# Patient Record
Sex: MALE | Race: WHITE | NOT HISPANIC OR LATINO | URBAN - METROPOLITAN AREA
[De-identification: names, ages, dates, MRNs, and addresses within clinical notes are randomized per-mention and may not be internally consistent; named-entity substitution may affect disease eponyms.]

---

## 2017-07-07 ENCOUNTER — OUTPATIENT (OUTPATIENT)
Dept: OUTPATIENT SERVICES | Facility: HOSPITAL | Age: 47
LOS: 1 days | Discharge: HOME | End: 2017-07-07

## 2017-07-07 DIAGNOSIS — F11.20 OPIOID DEPENDENCE, UNCOMPLICATED: ICD-10-CM

## 2017-07-12 ENCOUNTER — OUTPATIENT (OUTPATIENT)
Dept: OUTPATIENT SERVICES | Facility: HOSPITAL | Age: 47
LOS: 1 days | Discharge: HOME | End: 2017-07-12

## 2017-07-12 DIAGNOSIS — I49.9 CARDIAC ARRHYTHMIA, UNSPECIFIED: ICD-10-CM

## 2018-06-19 ENCOUNTER — OUTPATIENT (OUTPATIENT)
Dept: OUTPATIENT SERVICES | Facility: HOSPITAL | Age: 48
LOS: 1 days | Discharge: HOME | End: 2018-06-19

## 2018-06-19 DIAGNOSIS — F11.20 OPIOID DEPENDENCE, UNCOMPLICATED: ICD-10-CM

## 2018-07-13 ENCOUNTER — OUTPATIENT (OUTPATIENT)
Dept: OUTPATIENT SERVICES | Facility: HOSPITAL | Age: 48
LOS: 1 days | Discharge: HOME | End: 2018-07-13

## 2018-07-13 DIAGNOSIS — I49.9 CARDIAC ARRHYTHMIA, UNSPECIFIED: ICD-10-CM

## 2019-05-30 ENCOUNTER — EMERGENCY (EMERGENCY)
Facility: HOSPITAL | Age: 49
LOS: 0 days | Discharge: HOME | End: 2019-05-30
Attending: EMERGENCY MEDICINE
Payer: MEDICARE

## 2019-05-30 VITALS
HEART RATE: 101 BPM | SYSTOLIC BLOOD PRESSURE: 124 MMHG | OXYGEN SATURATION: 95 % | DIASTOLIC BLOOD PRESSURE: 84 MMHG | RESPIRATION RATE: 18 BRPM

## 2019-05-30 VITALS — HEART RATE: 91 BPM

## 2019-05-30 DIAGNOSIS — R69 ILLNESS, UNSPECIFIED: ICD-10-CM

## 2019-05-30 DIAGNOSIS — F41.9 ANXIETY DISORDER, UNSPECIFIED: ICD-10-CM

## 2019-05-30 DIAGNOSIS — Z02.9 ENCOUNTER FOR ADMINISTRATIVE EXAMINATIONS, UNSPECIFIED: ICD-10-CM

## 2019-05-30 DIAGNOSIS — F32.9 MAJOR DEPRESSIVE DISORDER, SINGLE EPISODE, UNSPECIFIED: ICD-10-CM

## 2019-05-30 DIAGNOSIS — F13.20 SEDATIVE, HYPNOTIC OR ANXIOLYTIC DEPENDENCE, UNCOMPLICATED: ICD-10-CM

## 2019-05-30 DIAGNOSIS — F43.10 POST-TRAUMATIC STRESS DISORDER, UNSPECIFIED: ICD-10-CM

## 2019-05-30 LAB
ANION GAP SERPL CALC-SCNC: 13 MMOL/L — SIGNIFICANT CHANGE UP (ref 7–14)
APAP SERPL-MCNC: <5 UG/ML — LOW (ref 10–30)
BASOPHILS # BLD AUTO: 0.07 K/UL — SIGNIFICANT CHANGE UP (ref 0–0.2)
BASOPHILS NFR BLD AUTO: 0.5 % — SIGNIFICANT CHANGE UP (ref 0–1)
BUN SERPL-MCNC: 19 MG/DL — SIGNIFICANT CHANGE UP (ref 10–20)
CALCIUM SERPL-MCNC: 9.3 MG/DL — SIGNIFICANT CHANGE UP (ref 8.5–10.1)
CHLORIDE SERPL-SCNC: 104 MMOL/L — SIGNIFICANT CHANGE UP (ref 98–110)
CO2 SERPL-SCNC: 22 MMOL/L — SIGNIFICANT CHANGE UP (ref 17–32)
CREAT SERPL-MCNC: 1 MG/DL — SIGNIFICANT CHANGE UP (ref 0.7–1.5)
EOSINOPHIL # BLD AUTO: 0.16 K/UL — SIGNIFICANT CHANGE UP (ref 0–0.7)
EOSINOPHIL NFR BLD AUTO: 1.1 % — SIGNIFICANT CHANGE UP (ref 0–8)
ETHANOL SERPL-MCNC: <10 MG/DL — SIGNIFICANT CHANGE UP
GLUCOSE SERPL-MCNC: 84 MG/DL — SIGNIFICANT CHANGE UP (ref 70–99)
HCT VFR BLD CALC: 42.3 % — SIGNIFICANT CHANGE UP (ref 42–52)
HGB BLD-MCNC: 13.4 G/DL — LOW (ref 14–18)
IMM GRANULOCYTES NFR BLD AUTO: 1 % — HIGH (ref 0.1–0.3)
LIDOCAIN IGE QN: 25 U/L — SIGNIFICANT CHANGE UP (ref 7–60)
LYMPHOCYTES # BLD AUTO: 36.9 % — SIGNIFICANT CHANGE UP (ref 20.5–51.1)
LYMPHOCYTES # BLD AUTO: 5.27 K/UL — HIGH (ref 1.2–3.4)
MCHC RBC-ENTMCNC: 21.3 PG — LOW (ref 27–31)
MCHC RBC-ENTMCNC: 31.7 G/DL — LOW (ref 32–37)
MCV RBC AUTO: 67.1 FL — LOW (ref 80–94)
MONOCYTES # BLD AUTO: 1.1 K/UL — HIGH (ref 0.1–0.6)
MONOCYTES NFR BLD AUTO: 7.7 % — SIGNIFICANT CHANGE UP (ref 1.7–9.3)
NEUTROPHILS # BLD AUTO: 7.56 K/UL — HIGH (ref 1.4–6.5)
NEUTROPHILS NFR BLD AUTO: 52.8 % — SIGNIFICANT CHANGE UP (ref 42.2–75.2)
NRBC # BLD: 0 /100 WBCS — SIGNIFICANT CHANGE UP (ref 0–0)
PLATELET # BLD AUTO: 220 K/UL — SIGNIFICANT CHANGE UP (ref 130–400)
POTASSIUM SERPL-MCNC: 4.3 MMOL/L — SIGNIFICANT CHANGE UP (ref 3.5–5)
POTASSIUM SERPL-SCNC: 4.3 MMOL/L — SIGNIFICANT CHANGE UP (ref 3.5–5)
RBC # BLD: 6.3 M/UL — HIGH (ref 4.7–6.1)
RBC # FLD: 19.5 % — HIGH (ref 11.5–14.5)
SALICYLATES SERPL-MCNC: <0.3 MG/DL — LOW (ref 4–30)
SODIUM SERPL-SCNC: 139 MMOL/L — SIGNIFICANT CHANGE UP (ref 135–146)
WBC # BLD: 14.3 K/UL — HIGH (ref 4.8–10.8)
WBC # FLD AUTO: 14.3 K/UL — HIGH (ref 4.8–10.8)

## 2019-05-30 PROCEDURE — 93010 ELECTROCARDIOGRAM REPORT: CPT

## 2019-05-30 PROCEDURE — 90792 PSYCH DIAG EVAL W/MED SRVCS: CPT | Mod: GC

## 2019-05-30 PROCEDURE — 99284 EMERGENCY DEPT VISIT MOD MDM: CPT

## 2019-05-30 RX ORDER — ALPRAZOLAM 0.25 MG
2 TABLET ORAL ONCE
Refills: 0 | Status: DISCONTINUED | OUTPATIENT
Start: 2019-05-30 | End: 2019-05-30

## 2019-05-30 RX ADMIN — Medication 2 MILLIGRAM(S): at 07:55

## 2019-05-30 RX ADMIN — Medication 25 MILLIGRAM(S): at 10:12

## 2019-05-30 NOTE — ED BEHAVIORAL HEALTH ASSESSMENT NOTE - DETAILS
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

## 2019-05-30 NOTE — ED BEHAVIORAL HEALTH ASSESSMENT NOTE - DESCRIPTION (FIRST USE, LAST USE, QUANTITY, FREQUENCY, DURATION)
1/2 PPD for "many years" social drinker until 1 week ago - daily use of 2 glasses of scotch per day socially decades ago, with "one key bump" 1 week ago reports Percocet prescription given for work related injury which subsequent development into use disorder, does not elaborate on frist use or quantity, but reports last use as 20 years ago patient reports 15 year hx of Xanax use, prescribed by his psychiatrist, with recent increase as per HPI
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
## 2019-05-30 NOTE — ED BEHAVIORAL HEALTH ASSESSMENT NOTE - CASE SUMMARY
48 y o  male, domiciled alone in private apartment x 1 month, previously lived with 2 children (16 y o son and 8 y o daughter) and wife, with whom he is  from for same duration, retired/disabled, subsisting on 3/4 pension and SSD, no past medical history, past psychiatric history of PTSD (in remission), depression (compliant with Lexapro), anxiety, xanax use disorder, remote hx of opiate use disorder w/ one detox admission, with one remote [facility] hospitalization for severe depression, no past suicide attempts, with 1 episode of non suicidal self injurious behavior 1 week ago via cutting b/l wrists, presents to the ED for xanax withdrawal symptoms/requesting detox.  Psychiatry consult placed for same.     Patient initially presented with symptoms of benzodiazepine withdrawal which remitted after he was given Xanax 2mg in ED this morning. He has had symptoms of benzodiazepines withdrawal including seizures for the past week after not having access to benzodiazepines due to overuse. As such, he has been self medicating with alcohol and also frequenting ERs to obtain benzodiazepine dose. He would benefit from detoxification followed by follow up in dual diagnosis clinic for manage of Anxiety and benzodiazepine use disorder. Does not present with imminent risk based on risk assessment above. Patient's outpatient psychiatrist is aware of his presentation.

## 2019-05-30 NOTE — ED BEHAVIORAL HEALTH ASSESSMENT NOTE - SUMMARY
Patient is a 48 y o  male, domiciled alone in private apartment x 1 month, previously lived with 2 children (16 y o son and 8 y o daughter) and wife, with whom he is  from for same duration, retired/disabled, subsisting on 3/4 pension and SSD, no past medical history, past psychiatric history of PTSD (in remission), depression (compliant with Lexapro), anxiety, xanax use disorder, remote hx of opiate use disorder w/ one detox admission, with one remote IPP hospitalization for severe depression, no past suicide attempts, with 1 episode of non suicidal self injurious behavior 1 week ago via cutting b/l wrists, presents to the ED for xanax withdrawal symptoms/requesting detox.  Psychiatry consult placed for same.     Upon evaluation, patient is not currently endorsing symptoms or exhibiting signs of Xanax withdrawal, which is to be expected, given recent dose of 2mg Xanax given in the ED.   Patient does, however, present with a problematic pattern of anxiolytic use, manifested by taking anxiolytics in larger amounts than prescribed, development of tolerance and withdrawal symptoms (as early as this morning prior to Xanax dose), leading to non suicidal self injurious behavior and self medication with other substances.  Patient's symptoms are consistent with xanax use disorder and he would likely benefit from inpatient admission for detoxification, with follow up with psychiatrist specializing in dual diagnosis, given patient's comorbid depression and anxiety. Patient is agreeable albeit with hesitation.  Lastly, though patient did engage in self injurious behavior, his intent was not suicidal, he is currently denying suicidal ideation/intent or plan and as outlined by risk assessment below does not meet criteria for involuntary inpatient psychiatric hospitalization.     Recommendations:   -transfer to [facility] Detox Unit
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
## 2019-05-30 NOTE — ED BEHAVIORAL HEALTH ASSESSMENT NOTE - OTHER
as per HPI until one month ago, domiciled with wife and 2 children  as of one month ago +superficial horizontal self cutting marks on b/l wrists

## 2019-05-30 NOTE — ED ADULT NURSE NOTE - NSIMPLEMENTINTERV_GEN_ALL_ED
Implemented All Universal Safety Interventions:  [place] to call system. Call bell, personal items and telephone within reach. Instruct patient to call for assistance. Room bathroom lighting operational. Non-slip footwear when patient is off stretcher. Physically safe environment: no spills, clutter or unnecessary equipment. Stretcher in lowest position, wheels locked, appropriate side rails in place.

## 2019-05-30 NOTE — ED ADULT NURSE NOTE - CHIEF COMPLAINT
Have Your Skin Lesions Been Treated?: been treated
Is This A New Presentation, Or A Follow-Up?: Skin Lesions
The patient is a 48y Male complaining of detox.

## 2019-05-30 NOTE — ED BEHAVIORAL HEALTH ASSESSMENT NOTE - HPI (INCLUDE ILLNESS QUALITY, SEVERITY, DURATION, TIMING, CONTEXT, MODIFYING FACTORS, ASSOCIATED SIGNS AND SYMPTOMS)
Patient is a 48 y o  male, domiciled alone in private apartment x 1 month, previously lived with 2 children (16 y o son and 8 y o daughter) and wife, with whom he is  from for same duration, retired/disabled, subsisting on 3/4 pension and SSD, no past medical history, past psychiatric history of PTSD (in remission), depression (compliant with Lexapro), anxiety, xanax use disorder, remote hx of opiate use disorder w/ one detox admission, with one remote IPP hospitalization for severe depression, no past suicide attempts, with 1 episode of non suicidal self injurious behavior 1 week ago via cutting b/l wrists, presents to the ED for xanax withdrawal symptoms/requesting detox.  Psychiatry consult placed for same.     On approach, patient is sitting on stretcher, calm and cooperative, having received 2mg PO Xanax at 7:55AM.  Patient reports that in the last two weeks he has been undergoing increased stress in light of  from his wife, moving out of his apartment, being estranged from his children and dealing with his mother's cancer diagnosis.  Reports that to deal with the stressors, he has been using increasing amounts of Xanax -- eight to ten 2mg pills per day, prescribed as 2mg PO QID (confirmed with [facility] and pharmacy - last Alprazolam 2mg PO QID Rx, quantity 120, filled on 5/15).  Reports "when everything fell apart, the more I took, the more I slept." Reports that due to the overuse (which he states has never occurred in his "15 years" of being prescribed Xanax,  he ran out of his medications early ("1 week ago") and began to experience withdrawal symptoms, specifically 2 seizures (last on Wednesday, witnessed by father), being drenched in sweat, anxiety, shakes, feeling that he is crawling out of his skin, and difficulty eating and drinking due to the discomfort.  Reports that to cope with his symptoms, he engaged in self cutting of b/l wrists with a razor on Monday, with the intention to distract himself from the discomfort of the withdrawal symptoms, without intention to die.  Also reports that in the week that he ran out of medication, he has visited several emergency rooms in [facility], [place], the last of which discharged him w/ Ativan.  Patient reports that he has also started drinking alcohol to cope with the symptoms - "couple of glasses of scotch per day," with last drink of 1 beer this morning; also reports "one bump" of cocaine at his friend's house 1 week ago.  Patient denies any symptoms of withdrawal on interview and reports that while he initially came in seeking detox he "feel[s] better now with the Xanax dose and I'll be ok if I can just have my regular prescription, I won't overuse again, what will 3-5 days of detox do?"  Patient is provided w/ psychoeducation about the dangers of withdrawal and benzodiazepine overuse, benefit of detox and subsequent follow up with dual diagnosis psychiatrist,  Patient agreeable to detox at the end of interview, however, tentative.  Patient denies any current suicidal ideation, intent or plan (reports "that's for cowards, I wouldn't do that, I have a gun at home if I wanted to do that I already would have; I have my kids to look forward to.") Reports "upset" mood, however, denies any other symptoms consistent with depression, anxiety, sumeet, psychosis, or PTSD.     Writer attempted to obtain personal collateral from patient's father ([phone]), who did not answer the phone.    Writer also attempted to contact patient's psychiatrist,  ([phone]).  As per his , Verónica, physician was unavailable for call back until mid afternoon, however, writer and attending left detailed message about patient's presentation with request for call back.  Though unable to be reached by physicians, patient was observed to be speaking to his psychiatrist via telephone at end of interview.

## 2019-05-30 NOTE — ED PROVIDER NOTE - OBJECTIVE STATEMENT
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 Ran out of Xanax one week ago. c/o tremors, palpitations, sweating x few days. States his doctor will not prescribe him any more because it is too soon. No chest pain. No fever. Would like detox. Also reports feeling depressed. Slashed his wrists yesterday. Has access to firearms.

## 2019-05-30 NOTE — ED BEHAVIORAL HEALTH ASSESSMENT NOTE - SAFETY PLAN DETAILS
patient engages in safety planing and states that if he should developed thoughts of wanting to harm or kill himself he will return to the ED, call 911 or call suicide hotline

## 2019-05-30 NOTE — ED BEHAVIORAL HEALTH ASSESSMENT NOTE - RISK ASSESSMENT
Patient has elevated risk for self harm given sex, xanax use disorder, withdrawal symptoms severe enough to result in self injurious behavior, access to firearm at home and past history of trauma.  These risk factors, however, are mitigated by willingness to seek care in the emergency room, future orientation, follow up and strong alliance with long term psychiatrist.  Given mitigating factors, patient is not current at imminent risk for self harm and does not meet criteria for involuntary inpatient psychiatric hospitalization at this time. Patient has elevated risk for self harm given sex, xanax use disorder, withdrawal symptoms severe enough to result in self injurious behavior, access to firearm at home and past history of trauma.  These risk factors, however, are mitigated by willingness to seek care in the emergency room, future orientation, follow up and strong alliance with long term psychiatrist, along with no current or prior suicidality and social support from father.  Given mitigating factors, patient is not current at imminent risk for self harm and does not meet criteria for involuntary inpatient psychiatric hospitalization at this time.

## 2019-05-30 NOTE — ED BEHAVIORAL HEALTH ASSESSMENT NOTE - NS ED BHA PLAN TR BH CONTACTED FT
([phone]), psychiatrist; As per his , Verónica, physician was unavailable for call back until mid afternoon, however, writer and attending left detailed message about patient's presentation with request for call back

## 2019-05-30 NOTE — ED PROVIDER NOTE - NSFOLLOWUPCLINICS_GEN_ALL_ED_FT
[facility]  Detox Mgmt  [address] [place] [address]  Phone: [phone]  Fax:   Follow Up Time: 1-3 Days

## 2019-05-31 ENCOUNTER — INPATIENT (INPATIENT)
Facility: HOSPITAL | Age: 49
LOS: 4 days | Discharge: LEFT BEFORE DISCHARGE | End: 2019-06-05
Attending: INTERNAL MEDICINE | Admitting: INTERNAL MEDICINE
Payer: MEDICARE

## 2019-05-31 VITALS
SYSTOLIC BLOOD PRESSURE: 134 MMHG | HEIGHT: 70 IN | RESPIRATION RATE: 16 BRPM | TEMPERATURE: 97 F | WEIGHT: 235.01 LBS | HEART RATE: 84 BPM | DIASTOLIC BLOOD PRESSURE: 66 MMHG

## 2019-05-31 DIAGNOSIS — F13.20 SEDATIVE, HYPNOTIC OR ANXIOLYTIC DEPENDENCE, UNCOMPLICATED: ICD-10-CM

## 2019-05-31 DIAGNOSIS — F10.10 ALCOHOL ABUSE, UNCOMPLICATED: ICD-10-CM

## 2019-05-31 DIAGNOSIS — Z90.49 ACQUIRED ABSENCE OF OTHER SPECIFIED PARTS OF DIGESTIVE TRACT: Chronic | ICD-10-CM

## 2019-05-31 DIAGNOSIS — F41.9 ANXIETY DISORDER, UNSPECIFIED: ICD-10-CM

## 2019-05-31 DIAGNOSIS — F17.200 NICOTINE DEPENDENCE, UNSPECIFIED, UNCOMPLICATED: ICD-10-CM

## 2019-05-31 LAB
ALBUMIN SERPL ELPH-MCNC: 4 G/DL — SIGNIFICANT CHANGE UP (ref 3.5–5.2)
ALP SERPL-CCNC: 77 U/L — SIGNIFICANT CHANGE UP (ref 30–115)
ALT FLD-CCNC: 19 U/L — SIGNIFICANT CHANGE UP (ref 0–41)
AMMONIA BLD-MCNC: 25 UMOL/L — SIGNIFICANT CHANGE UP (ref 11–55)
AMPHET UR-MCNC: NEGATIVE — SIGNIFICANT CHANGE UP
AMYLASE P1 CFR SERPL: 29 U/L — SIGNIFICANT CHANGE UP (ref 25–115)
ANION GAP SERPL CALC-SCNC: 5 MMOL/L — LOW (ref 7–14)
APPEARANCE UR: CLEAR — SIGNIFICANT CHANGE UP
APTT BLD: 31.9 SEC — SIGNIFICANT CHANGE UP (ref 27–39.2)
AST SERPL-CCNC: 15 U/L — SIGNIFICANT CHANGE UP (ref 0–41)
BARBITURATES UR SCN-MCNC: NEGATIVE — SIGNIFICANT CHANGE UP
BASOPHILS # BLD AUTO: 0.03 K/UL — SIGNIFICANT CHANGE UP (ref 0–0.2)
BASOPHILS NFR BLD AUTO: 0.3 % — SIGNIFICANT CHANGE UP (ref 0–1)
BENZODIAZ UR-MCNC: POSITIVE
BILIRUB SERPL-MCNC: 0.4 MG/DL — SIGNIFICANT CHANGE UP (ref 0.2–1.2)
BILIRUB UR-MCNC: NEGATIVE — SIGNIFICANT CHANGE UP
BUN SERPL-MCNC: 21 MG/DL — HIGH (ref 10–20)
CALCIUM SERPL-MCNC: 8.7 MG/DL — SIGNIFICANT CHANGE UP (ref 8.5–10.1)
CHLORIDE SERPL-SCNC: 108 MMOL/L — SIGNIFICANT CHANGE UP (ref 98–110)
CHOLEST SERPL-MCNC: 145 MG/DL — SIGNIFICANT CHANGE UP (ref 100–200)
CO2 SERPL-SCNC: 28 MMOL/L — SIGNIFICANT CHANGE UP (ref 17–32)
COCAINE METAB.OTHER UR-MCNC: POSITIVE
COLOR SPEC: YELLOW — SIGNIFICANT CHANGE UP
COMMENT - URINE: SIGNIFICANT CHANGE UP
CREAT SERPL-MCNC: 1.1 MG/DL — SIGNIFICANT CHANGE UP (ref 0.7–1.5)
DIFF PNL FLD: NEGATIVE — SIGNIFICANT CHANGE UP
DRUG SCREEN 1, URINE RESULT: SIGNIFICANT CHANGE UP
EOSINOPHIL # BLD AUTO: 0.14 K/UL — SIGNIFICANT CHANGE UP (ref 0–0.7)
EOSINOPHIL NFR BLD AUTO: 1.3 % — SIGNIFICANT CHANGE UP (ref 0–8)
EPI CELLS # UR: ABNORMAL /HPF
ESTIMATED AVERAGE GLUCOSE: 108 MG/DL — SIGNIFICANT CHANGE UP (ref 68–114)
ETHANOL SERPL-MCNC: <10 MG/DL — SIGNIFICANT CHANGE UP
GGT SERPL-CCNC: 48 U/L — HIGH (ref 1–40)
GLUCOSE SERPL-MCNC: 98 MG/DL — SIGNIFICANT CHANGE UP (ref 70–99)
GLUCOSE UR QL: NEGATIVE MG/DL — SIGNIFICANT CHANGE UP
HBA1C BLD-MCNC: 5.4 % — SIGNIFICANT CHANGE UP (ref 4–5.6)
HCT VFR BLD CALC: 41.1 % — LOW (ref 42–52)
HDLC SERPL-MCNC: 33 MG/DL — LOW
HGB BLD-MCNC: 12.8 G/DL — LOW (ref 14–18)
IMM GRANULOCYTES NFR BLD AUTO: 1.3 % — HIGH (ref 0.1–0.3)
INR BLD: 0.98 RATIO — SIGNIFICANT CHANGE UP (ref 0.65–1.3)
KETONES UR-MCNC: NEGATIVE — SIGNIFICANT CHANGE UP
LEUKOCYTE ESTERASE UR-ACNC: NEGATIVE — SIGNIFICANT CHANGE UP
LIPID PNL WITH DIRECT LDL SERPL: 100 MG/DL — SIGNIFICANT CHANGE UP (ref 4–129)
LYMPHOCYTES # BLD AUTO: 36.5 % — SIGNIFICANT CHANGE UP (ref 20.5–51.1)
LYMPHOCYTES # BLD AUTO: 4.09 K/UL — HIGH (ref 1.2–3.4)
MAGNESIUM SERPL-MCNC: 2.2 MG/DL — SIGNIFICANT CHANGE UP (ref 1.8–2.4)
MCHC RBC-ENTMCNC: 21.7 PG — LOW (ref 27–31)
MCHC RBC-ENTMCNC: 31.1 G/DL — LOW (ref 32–37)
MCV RBC AUTO: 69.8 FL — LOW (ref 80–94)
METHADONE UR-MCNC: NEGATIVE — SIGNIFICANT CHANGE UP
MONOCYTES # BLD AUTO: 0.83 K/UL — HIGH (ref 0.1–0.6)
MONOCYTES NFR BLD AUTO: 7.4 % — SIGNIFICANT CHANGE UP (ref 1.7–9.3)
NEUTROPHILS # BLD AUTO: 5.97 K/UL — SIGNIFICANT CHANGE UP (ref 1.4–6.5)
NEUTROPHILS NFR BLD AUTO: 53.2 % — SIGNIFICANT CHANGE UP (ref 42.2–75.2)
NITRITE UR-MCNC: NEGATIVE — SIGNIFICANT CHANGE UP
NRBC # BLD: 0 /100 WBCS — SIGNIFICANT CHANGE UP (ref 0–0)
OPIATES UR-MCNC: NEGATIVE — SIGNIFICANT CHANGE UP
PCP UR-MCNC: NEGATIVE — SIGNIFICANT CHANGE UP
PH UR: 6 — SIGNIFICANT CHANGE UP (ref 5–8)
PLATELET # BLD AUTO: 191 K/UL — SIGNIFICANT CHANGE UP (ref 130–400)
POTASSIUM SERPL-MCNC: 4.2 MMOL/L — SIGNIFICANT CHANGE UP (ref 3.5–5)
POTASSIUM SERPL-SCNC: 4.2 MMOL/L — SIGNIFICANT CHANGE UP (ref 3.5–5)
PROPOXYPHENE QUALITATIVE URINE RESULT: NEGATIVE — SIGNIFICANT CHANGE UP
PROT SERPL-MCNC: 6.1 G/DL — SIGNIFICANT CHANGE UP (ref 6–8)
PROT UR-MCNC: ABNORMAL MG/DL
PROTHROM AB SERPL-ACNC: 11.3 SEC — SIGNIFICANT CHANGE UP (ref 9.95–12.87)
RBC # BLD: 5.89 M/UL — SIGNIFICANT CHANGE UP (ref 4.7–6.1)
RBC # FLD: 18.4 % — HIGH (ref 11.5–14.5)
RBC CASTS # UR COMP ASSIST: NEGATIVE — SIGNIFICANT CHANGE UP
SODIUM SERPL-SCNC: 141 MMOL/L — SIGNIFICANT CHANGE UP (ref 135–146)
SP GR SPEC: 1.02 — SIGNIFICANT CHANGE UP (ref 1.01–1.03)
THC UR QL: NEGATIVE — SIGNIFICANT CHANGE UP
TOTAL CHOLESTEROL/HDL RATIO MEASUREMENT: 4.4 RATIO — SIGNIFICANT CHANGE UP (ref 4–5.5)
TRIGL SERPL-MCNC: 310 MG/DL — HIGH (ref 10–149)
UROBILINOGEN FLD QL: 0.2 MG/DL — SIGNIFICANT CHANGE UP (ref 0.2–0.2)
WBC # BLD: 11.2 K/UL — HIGH (ref 4.8–10.8)
WBC # FLD AUTO: 11.2 K/UL — HIGH (ref 4.8–10.8)
WBC UR QL: SIGNIFICANT CHANGE UP /HPF

## 2019-05-31 RX ORDER — ESCITALOPRAM OXALATE 10 MG/1
20 TABLET, FILM COATED ORAL DAILY
Refills: 0 | Status: DISCONTINUED | OUTPATIENT
Start: 2019-05-31 | End: 2019-06-05

## 2019-05-31 RX ORDER — PHENOBARBITAL 60 MG
48.6 TABLET ORAL EVERY 6 HOURS
Refills: 0 | Status: DISCONTINUED | OUTPATIENT
Start: 2019-06-01 | End: 2019-06-01

## 2019-05-31 RX ORDER — ESCITALOPRAM OXALATE 10 MG/1
1 TABLET, FILM COATED ORAL
Qty: 0 | Refills: 0 | DISCHARGE

## 2019-05-31 RX ORDER — IBUPROFEN 200 MG
400 TABLET ORAL EVERY 6 HOURS
Refills: 0 | Status: DISCONTINUED | OUTPATIENT
Start: 2019-05-31 | End: 2019-06-05

## 2019-05-31 RX ORDER — PHENOBARBITAL 60 MG
64.8 TABLET ORAL EVERY 6 HOURS
Refills: 0 | Status: DISCONTINUED | OUTPATIENT
Start: 2019-05-31 | End: 2019-05-31

## 2019-05-31 RX ORDER — MAGNESIUM HYDROXIDE 400 MG/1
30 TABLET, CHEWABLE ORAL ONCE
Refills: 0 | Status: DISCONTINUED | OUTPATIENT
Start: 2019-05-31 | End: 2019-06-05

## 2019-05-31 RX ORDER — MULTIVIT-MIN/FERROUS GLUCONATE 9 MG/15 ML
1 LIQUID (ML) ORAL DAILY
Refills: 0 | Status: DISCONTINUED | OUTPATIENT
Start: 2019-05-31 | End: 2019-06-05

## 2019-05-31 RX ORDER — PHENOBARBITAL 60 MG
32.4 TABLET ORAL EVERY 4 HOURS
Refills: 0 | Status: DISCONTINUED | OUTPATIENT
Start: 2019-05-31 | End: 2019-06-05

## 2019-05-31 RX ORDER — PHENOBARBITAL 60 MG
32.4 TABLET ORAL EVERY 12 HOURS
Refills: 0 | Status: DISCONTINUED | OUTPATIENT
Start: 2019-06-04 | End: 2019-06-04

## 2019-05-31 RX ORDER — PHENOBARBITAL 60 MG
TABLET ORAL
Refills: 0 | Status: DISCONTINUED | OUTPATIENT
Start: 2019-05-31 | End: 2019-06-04

## 2019-05-31 RX ORDER — PSEUDOEPHEDRINE HCL 30 MG
60 TABLET ORAL EVERY 6 HOURS
Refills: 0 | Status: DISCONTINUED | OUTPATIENT
Start: 2019-05-31 | End: 2019-06-05

## 2019-05-31 RX ORDER — METHOCARBAMOL 500 MG/1
500 TABLET, FILM COATED ORAL EVERY 6 HOURS
Refills: 0 | Status: DISCONTINUED | OUTPATIENT
Start: 2019-05-31 | End: 2019-06-05

## 2019-05-31 RX ORDER — ACETAMINOPHEN 500 MG
650 TABLET ORAL EVERY 4 HOURS
Refills: 0 | Status: DISCONTINUED | OUTPATIENT
Start: 2019-05-31 | End: 2019-06-05

## 2019-05-31 RX ORDER — HYDROXYZINE HCL 10 MG
100 TABLET ORAL AT BEDTIME
Refills: 0 | Status: DISCONTINUED | OUTPATIENT
Start: 2019-05-31 | End: 2019-06-02

## 2019-05-31 RX ORDER — GUAIFENESIN/DEXTROMETHORPHAN 600MG-30MG
5 TABLET, EXTENDED RELEASE 12 HR ORAL EVERY 4 HOURS
Refills: 0 | Status: DISCONTINUED | OUTPATIENT
Start: 2019-05-31 | End: 2019-06-05

## 2019-05-31 RX ORDER — HYDROXYZINE HCL 10 MG
50 TABLET ORAL EVERY 6 HOURS
Refills: 0 | Status: DISCONTINUED | OUTPATIENT
Start: 2019-05-31 | End: 2019-06-05

## 2019-05-31 RX ORDER — PHENOBARBITAL 60 MG
64.8 TABLET ORAL ONCE
Refills: 0 | Status: DISCONTINUED | OUTPATIENT
Start: 2019-05-31 | End: 2019-05-31

## 2019-05-31 RX ORDER — PHENOBARBITAL 60 MG
32.4 TABLET ORAL EVERY 6 HOURS
Refills: 0 | Status: DISCONTINUED | OUTPATIENT
Start: 2019-06-02 | End: 2019-06-03

## 2019-05-31 RX ADMIN — Medication 64.8 MILLIGRAM(S): at 17:32

## 2019-05-31 RX ADMIN — Medication 32.4 MILLIGRAM(S): at 21:39

## 2019-05-31 RX ADMIN — Medication 64.8 MILLIGRAM(S): at 23:46

## 2019-05-31 RX ADMIN — Medication 64.8 MILLIGRAM(S): at 12:26

## 2019-05-31 RX ADMIN — ESCITALOPRAM OXALATE 20 MILLIGRAM(S): 10 TABLET, FILM COATED ORAL at 20:31

## 2019-05-31 RX ADMIN — Medication 300 MILLIGRAM(S): at 21:40

## 2019-05-31 NOTE — H&P ADULT - ASSESSMENT
48y Male presents for detox with continuous Benzodiazepine use disorder and Alcohol use disorder. Patient admits to abusing Rx Xanax. Pt reports he has been prescribing xanax by his psychiatrist for 15 years and started to abuse after "everything falling apart from his life";  from his wife, moving out of his apartment, being estranged from his children and dealing with his mother's cancer diagnosis. Pt reports he ran out of xanax a week ago. started to drink 2 glasses of scotch for a week & cut his wrist on Monday to take off his edge from withdrawal. Pt reports had visited different hospitals ED in [place] last week 2/2 withdrawal,  H/O withdrawal seizure x 2, last seizure happened this Monday patient's father called 911 brought him to [facility] given 6 tablets of Ativan & D/C on Tuesday.  Pt walked in to [facility] ED due to W/D yesterday was given Xanax 2mg & Librium 25mg and referred to Intake. 48y Male presents for detox with continuous Benzodiazepine use disorder and Alcohol use disorder. Patient admits to abusing Rx Xanax. Pt reports he has been prescribing xanax by his psychiatrist for 15 years and started to abuse after "everything falling apart from his life";  from his wife, moving out of his apartment, being estranged from his children and dealing with his mother's cancer diagnosis. Pt reports he ran out of xanax a week ago. started to drink 2 glasses of scotch for a week & cut his wrist on Monday to take off his edge from withdrawal. Pt reports had visited different hospitals ED in [place] last week 2/2 withdrawal,  H/O withdrawal seizure x 2, last seizure happened this Monday patient's father called 911 brought him to [facility] given 6 tablets of Ativan & D/C on Tuesday.  Pt walked in to [facility] [place] ED due to W/D yesterday was given Xanax 2mg & Librium 25mg and referred to Intake.     Dr Juares was informed by phone call by Intake PA regarding to this detox admission and pt's abusing history. According to Dr Juares, patient will no longer be given Xanax by his office. Dr Juares also told Intake pt has been prescribing other psy meds including: Remeron 45mg PO QHS, Seroquel XR 200mg PO QHS, Gabapentin 300mg PO TID and Lexapro 20mg PO QD.    However, patient told Intake PA he only takes Lexapro daily and voluntarily stopped taking other meds for long time.

## 2019-05-31 NOTE — H&P ADULT - NSICDXPASTMEDICALHX_GEN_ALL_CORE_FT
PAST MEDICAL HISTORY:  Anxiety and depression     Nicotine dependence     Self-mutilation     Withdrawal seizures

## 2019-05-31 NOTE — H&P ADULT - NSHPSOCIALHISTORY_GEN_ALL_CORE
domiciled, staying with his father's apartment in [place]  smoking 10 cigarettes per day x35y

## 2019-05-31 NOTE — H&P ADULT - PROBLEM SELECTOR PLAN 3
pt refused Nicotine patch 2/2 h/o feeling dizziness  Smoking Cessation advised  Smoking Education provided

## 2019-05-31 NOTE — H&P ADULT - NSHPPHYSICALEXAM_GEN_ALL_CORE
-  Vital Signs:      Temp: 97.7      Pulse:  90       RR: 14       BP: 100/84     Physical Exam:              Constitutional: +Anxious A&Ox3, W/N and W/D.  HEENT: NC/AT, PERRLA, EOM Intact, Nares normal, No Sinus tenderness.  Lips, mucosa and tongue normal; Neck supple, No adenopathy  Respiratory: CTAB, no rales, no rhonchi, no wheezes  Cardiovascular: +S1S2, No M/R/G  Gastrointestinal: +BS, soft, non-tender, not distended, No CVAT  Extremities: Atraumatic, no cyanosis, no edema, no calf tenderness,  Vascular: +dorsal pedis and radial pulses, no extremity cyanosis  Neurological: sensation intact, ROM equal B/L, CN II-XII intact, Gait: steady  Skin: + healing dry lesion from right wrist, no erythema or swelling, no rashes, normal turgor, No track marks  No Decubiti present  No IV lines present  Rectal/Breasts Exam: Deferred

## 2019-05-31 NOTE — H&P ADULT - PROBLEM SELECTOR PLAN 1
Admit to detox    Check Urine Toxicology  Phenobarbital Taper Protocol (severe protocol due to w/d seizure 4 days ago)  Monitor VS and withdrawal symptoms  PRN Medications  seizure precaution

## 2019-05-31 NOTE — H&P ADULT - HISTORY OF PRESENT ILLNESS
48y Male presents for detox with continuous Benzodiazepine use disorder and Alcohol use disorder. Patient admits to abusing Rx Xanax. Pt reports he has been prescribing xanax by his psychiatrist for 15 years and started to abuse after "everything falling apart from his life";  from his wife, moving out of his apartment, being estranged from his children and dealing with his mother's cancer diagnosis. Pt reports he ran out of xanax a week ago. started to drink 2 glasses of scotch for a week & cut his wrist on Monday to take off his edge from withdrawal. Pt reports had visited different hospitals ED in [place] last week 2/2 withdrawal,  H/O withdrawal seizure x 2, last seizure happened this Monday patient's father called 911 brought him to [facility] given 6 tablets of Ativan & D/C on Tuesday.  Pt walked in to [facility] ED due to W/D yesterday was given Xanax 2mg & Librium 25mg and referred to Intake.   Patient c/o feeling anxiety, insomnia, body aches, nausea, poor appetite, hot and chills intermittently and tremors.  Patient A&Ox3, denies cp, sob, headache, dizziness, bleeding and dysuria. Denies recent fall or trauma    Patient admits to abusing current substances as follows:  DRUG	AGE OF ONSET	ROUTE	FREQ	AMOUNT	LAST USE	LENGTH OF CURRENT USE	  Benzo: Rx: Xanax Ativan  Librium		PO	Xanax Daily Librium x 1 Ativan x 1  	Xanax :16-20mg  Librium 25mg Ativan 6 tablets 	Xanax 5/30/19 2mg at ED-N Librum 5/30/19 25mg at ED-N Ativan 5/28/19 at [facility] ED    	15 years	  H/O opioid abuse (Percocet)		PO			>10 years ago		  [facility]  x9 years 					2 years ago		  ETOH		PO	Daily	2 glasses of scotch	5/29/19 2 glasses	1 week	  							  [facility]:       Was prescriber informed by Intake Clinician?   Patient Name:	Charles Dominguez	YOB: 1970	  Address:	[address] [address]	Sex:	Male	  Rx Written	Rx Dispensed	Drug	Quantity	Days Supply	Prescriber Name	  05/15/2019	05/15/2019	alprazolam 2 mg tablet	120	30	Sarai Juares DO	  05/09/2019	05/09/2019	clonazepam 2 mg tablet	60	30	Sarai Juares DO	  04/15/2019	04/15/2019	alprazolam 2 mg tablet	120	30	Sarai Juares DO	  Last Detox: 2 years ago at [place]  Hx of Withdrawal Seizures: pt reported H/O seizure twice last on 5/27/19 and his father called ambulance brought to [facility] and discharged next day given with 6 tablets of Ativan.  Psyhx: Anxiety and Bipolar depression, currently on Lexapro 10mg PO QD x 15 years, H/O Self-cutting only one time on Monday, pt reported he tried to take the edge off from withdrawal, denies any suicidal ideation at that moment.  Denies current S/H Ideation or A/V Hallucination  Is patient currently receiving methadone from an M[facility]: No    Screening history	Last tested	Result	History of treatment	  HIV	2017	NEG	N/A	  Hepatitis C	2017	NEG	N/A	  Quantiferon GOLD TB test	2018	NEG	N/A	    Immunization	Not Received	Unknown	Received	Date Received 	  Influenza	v				  Pneumococcus	v				  Tetanus			v	<10 years	  Others 48y Male presents for detox with continuous Benzodiazepine use disorder and Alcohol use disorder. Patient admits to abusing Rx Xanax. Pt reports he has been prescribing xanax by his psychiatrist for 15 years and started to abuse after "everything falling apart from his life";  from his wife, moving out of his apartment, being estranged from his children and dealing with his mother's cancer diagnosis. Pt reports he ran out of xanax a week ago. started to drink 2 glasses of scotch for a week & cut his wrist on Monday to take off his edge from withdrawal. Pt reports had visited different hospitals ED in [place] last week 2/2 withdrawal,  H/O withdrawal seizure x 2, last seizure happened this Monday patient's father called 911 brought him to [facility] given 6 tablets of Ativan & D/C on Tuesday.  Pt walked in to [facility] ED due to W/D yesterday was given Xanax 2mg & Librium 25mg and referred to Intake.   Patient c/o feeling anxiety, insomnia, body aches, nausea, poor appetite, hot and chills intermittently and tremors.  Patient A&Ox3, denies cp, sob, headache, dizziness, bleeding and dysuria. Denies recent fall or trauma    Patient admits to abusing current substances as follows:  DRUG	AGE OF ONSET	ROUTE	FREQ	AMOUNT	LAST USE	LENGTH OF CURRENT USE	  Benzo: Rx: Xanax Ativan  Librium		PO	Xanax Daily Librium x 1 Ativan x 1  	Xanax :16-20mg  Librium 25mg Ativan 6 tablets 	Xanax 5/30/19 2mg at ED-N Librum 5/30/19 25mg at ED-N Ativan 5/28/19 at [facility] ED    	15 years	  H/O opioid abuse (Percocet)		PO			>10 years ago		  [facility]  x9 years 					2 years ago		  ETOH		PO	Daily	2 glasses of scotch	5/29/19 2 glasses	1 week	  							  [facility]:       Was prescriber informed by Intake Clinician? Dr Juares was informed via phone call  Patient Name:	Charles Dominguez	YOB: 1970	  Address:	[address] [place] [address]	Sex:	Male	  Rx Written	Rx Dispensed	Drug	Quantity	Days Supply	Prescriber Name	  05/15/2019	05/15/2019	alprazolam 2 mg tablet	120	30	Sarai Juares DO	  05/09/2019	05/09/2019	clonazepam 2 mg tablet	60	30	Sarai Juares DO	  04/15/2019	04/15/2019	alprazolam 2 mg tablet	120	30	Sarai Juares DO	  Last Detox: 2 years ago at [place]  Hx of Withdrawal Seizures: pt reported H/O seizure twice last on 5/27/19 and his father called ambulance brought to [facility] and discharged next day given with 6 tablets of Ativan.  Psyhx: Anxiety and Bipolar depression, currently on Lexapro 10mg PO QD x 15 years, H/O Self-cutting only one time on Monday, pt reported he tried to take the edge off from withdrawal, denies any suicidal ideation at that moment.  Denies current S/H Ideation or A/V Hallucination  Is patient currently receiving methadone from an MMTP: No    Screening history	Last tested	Result	History of treatment	  HIV	2017	NEG	N/A	  Hepatitis C	2017	NEG	N/A	  Quantiferon GOLD TB test	2018	NEG	N/A	    Immunization	Not Received	Unknown	Received	Date Received 	  Influenza	v				  Pneumococcus	v				  Tetanus			v	<10 years	  Others

## 2019-05-31 NOTE — H&P ADULT - ATTENDING COMMENTS
Patient interviewed and examined.    Chart reviewed.    PA's H&P noted and modified, as appropriate.    Case discussed on team rounds    Following is my summary of the case.    Admitted for detox: from ____ED, _X__Intake, ____Med/Surg Floor    Alcohol_X___   Opioid_____  Benzo_X__ Other_____    Substance amount, duration of use, last usage, and prior attempts at detox or rehabs, are outlined above in the H&P and discussed with patient.    Associated withdrawal symptoms presents.  Comorbid conditions noted. Chronic and Stable.    Past Medical Hx, Psych Hx, family Hx, Social Hx from H&P reviewed and NO changes.    Old medical record and medication Hx. Reviewed    Following items reviewed and addressed:  1. labs  2. EKG  3. Imaging from PACs module    Examination: no change from PA's exam.    Place on following protocol  _____Medically Managed  __X__Medically Supervised    [place]_____Librium taper____Ativan taper___Methadone taper___ Phenobarb taper__X__ Suboxone Induction____MMTP____    Narcan Kit Offered    Psych Consult __X__N/A  ___Ordered    Physical Therapy  ___X    ___  Ordered    Aftercare disposition to be addressed by counselors.    Estimated length of stay 3-5 days.

## 2019-05-31 NOTE — H&P ADULT - PROBLEM SELECTOR PLAN 4
observation  Atarax 50mg PO Q6H PRN for anxiety  Atarax 100mg PO QHS PRN for insomnia  psych consult PRN observation  c/w:  Lexapro 20mg PO QD  Atarax 50mg PO Q6H PRN for anxiety  Atarax 100mg PO QHS PRN for insomnia  psych consult PRN  F/U with Dr Juares after d/c

## 2019-06-01 LAB
HAV IGM SER-ACNC: SIGNIFICANT CHANGE UP
HBV CORE IGM SER-ACNC: SIGNIFICANT CHANGE UP
HBV SURFACE AG SER-ACNC: SIGNIFICANT CHANGE UP
HCV AB S/CO SERPL IA: 0.17 S/CO — SIGNIFICANT CHANGE UP (ref 0–0.99)
HCV AB SERPL-IMP: SIGNIFICANT CHANGE UP
HIV 1+2 AB+HIV1 P24 AG SERPL QL IA: SIGNIFICANT CHANGE UP

## 2019-06-01 RX ORDER — GABAPENTIN 400 MG/1
300 CAPSULE ORAL THREE TIMES A DAY
Refills: 0 | Status: DISCONTINUED | OUTPATIENT
Start: 2019-06-01 | End: 2019-06-03

## 2019-06-01 RX ADMIN — Medication 32.4 MILLIGRAM(S): at 15:30

## 2019-06-01 RX ADMIN — Medication 48.6 MILLIGRAM(S): at 11:02

## 2019-06-01 RX ADMIN — Medication 0.1 MILLIGRAM(S): at 23:28

## 2019-06-01 RX ADMIN — Medication 400 MILLIGRAM(S): at 08:58

## 2019-06-01 RX ADMIN — ESCITALOPRAM OXALATE 20 MILLIGRAM(S): 10 TABLET, FILM COATED ORAL at 08:13

## 2019-06-01 RX ADMIN — Medication 48.6 MILLIGRAM(S): at 05:59

## 2019-06-01 RX ADMIN — METHOCARBAMOL 500 MILLIGRAM(S): 500 TABLET, FILM COATED ORAL at 08:13

## 2019-06-01 RX ADMIN — GABAPENTIN 300 MILLIGRAM(S): 400 CAPSULE ORAL at 21:12

## 2019-06-01 RX ADMIN — Medication 48.6 MILLIGRAM(S): at 23:28

## 2019-06-01 RX ADMIN — GABAPENTIN 300 MILLIGRAM(S): 400 CAPSULE ORAL at 08:57

## 2019-06-01 RX ADMIN — Medication 48.6 MILLIGRAM(S): at 17:04

## 2019-06-01 RX ADMIN — Medication 400 MILLIGRAM(S): at 08:15

## 2019-06-01 RX ADMIN — Medication 1 TABLET(S): at 08:16

## 2019-06-01 RX ADMIN — Medication 100 MILLIGRAM(S): at 02:07

## 2019-06-01 RX ADMIN — Medication 32.4 MILLIGRAM(S): at 09:04

## 2019-06-01 RX ADMIN — GABAPENTIN 300 MILLIGRAM(S): 400 CAPSULE ORAL at 15:29

## 2019-06-01 RX ADMIN — Medication 50 MILLIGRAM(S): at 21:11

## 2019-06-02 PROCEDURE — 99231 SBSQ HOSP IP/OBS SF/LOW 25: CPT

## 2019-06-02 RX ORDER — TRAZODONE HCL 50 MG
100 TABLET ORAL AT BEDTIME
Refills: 0 | Status: DISCONTINUED | OUTPATIENT
Start: 2019-06-02 | End: 2019-06-05

## 2019-06-02 RX ADMIN — Medication 32.4 MILLIGRAM(S): at 08:35

## 2019-06-02 RX ADMIN — Medication 32.4 MILLIGRAM(S): at 17:47

## 2019-06-02 RX ADMIN — Medication 32.4 MILLIGRAM(S): at 21:00

## 2019-06-02 RX ADMIN — Medication 32.4 MILLIGRAM(S): at 05:07

## 2019-06-02 RX ADMIN — GABAPENTIN 300 MILLIGRAM(S): 400 CAPSULE ORAL at 09:50

## 2019-06-02 RX ADMIN — Medication 50 MILLIGRAM(S): at 08:35

## 2019-06-02 RX ADMIN — GABAPENTIN 300 MILLIGRAM(S): 400 CAPSULE ORAL at 20:52

## 2019-06-02 RX ADMIN — Medication 50 MILLIGRAM(S): at 17:47

## 2019-06-02 RX ADMIN — GABAPENTIN 300 MILLIGRAM(S): 400 CAPSULE ORAL at 13:30

## 2019-06-02 RX ADMIN — Medication 32.4 MILLIGRAM(S): at 11:58

## 2019-06-02 RX ADMIN — ESCITALOPRAM OXALATE 20 MILLIGRAM(S): 10 TABLET, FILM COATED ORAL at 09:50

## 2019-06-02 RX ADMIN — Medication 1 TABLET(S): at 09:50

## 2019-06-02 NOTE — CHART NOTE - NSCHARTNOTEFT_GEN_A_CORE
Subsequent Inpatient Encounter                                       Detox Unit    ANN-MARIE SUAREZ   48y   Male      Chief Complaint:    Follow up for Polysubstance  Dependency    HPI:     I reviewed previous notes. No Change, except if noted below.             Detail:_    ROS:   I reviewed with patient.  No changes from previous notes except if noted below.             Detail: _    PFSH I reviewed with patient. No changes from previous notes except if noted below.             Detail_    Medication reconciliation performed.    MEDICATIONS  (STANDING):  escitalopram 20 milliGRAM(s) Oral daily  gabapentin 300 milliGRAM(s) Oral three times a day  multivitamin/minerals 1 Tablet(s) Oral daily  PHENobarbital   Oral   PHENobarbital 32.4 milliGRAM(s) Oral every 6 hours      MEDICATIONS  (PRN):  acetaminophen   Tablet .. 650 milliGRAM(s) Oral every 4 hours PRN Temp greater or equal to 38C (100.4F), Moderate Pain (4 - 6)  aluminum hydroxide/magnesium hydroxide/simethicone Suspension 30 milliLiter(s) Oral every 6 hours PRN Heartburn  bismuth subsalicylate Liquid 30 milliLiter(s) Oral every 6 hours PRN Diarrhea  cloNIDine 0.1 milliGRAM(s) Oral every 8 hours PRN Blood Pressure GREATER THAN 140/90 mmHG  guaiFENesin/dextromethorphan  Syrup 5 milliLiter(s) Oral every 4 hours PRN Cough  hydrOXYzine hydrochloride 50 milliGRAM(s) Oral every 6 hours PRN Anxiety  hydrOXYzine hydrochloride 100 milliGRAM(s) Oral at bedtime PRN insomnia  ibuprofen  Tablet. 400 milliGRAM(s) Oral every 6 hours PRN Mild Pain (1 - 3)  magnesium hydroxide Suspension 30 milliLiter(s) Oral once PRN Constipation  methocarbamol 500 milliGRAM(s) Oral every 6 hours PRN muscle pain  PHENobarbital 32.4 milliGRAM(s) Oral every 4 hours PRN Withdrawal  pseudoephedrine 60 milliGRAM(s) Oral every 6 hours PRN Rhinitis  trimethobenzamide 300 milliGRAM(s) Oral every 6 hours PRN Nausea and/or Vomiting  trimethobenzamide Injectable 200 milliGRAM(s) IntraMuscular every 6 hours PRN Nausea and/or Vomiting      T(C): 35.8 (19 @ 06:00), Max: 36.4 (19 @ 00:09)  HR: 77 (06-02-19 @ 06:00) (69 - 91)  BP: 155/87 (19 @ 06:00) (122/84 - 170/63)  RR: 16 (19 @ 06:00) (16 - 17)  SpO2: --    PHYSICAL EXAM:      Constitutional: NAD, A&O x3    Eyes: PERRLA, no conjuctivitis    Neck: no lymphadenopathy    Respiratory: +air entry, no rales, no rhonchi, no wheezes    Cardiovascular: +S1 and S2, regular rate and rhythm    Gastrointestinal: +BS, soft, non-tender, not distended    Extremities:  no edema, no calf tenderness    Skin: no rashes, normal turgor                            12.8   11.20 )-----------( 191      ( 31 May 2019 16:12 )             41.1       141  |  108  |  21<H>  ----------------------------<  98  4.2   |  28  |  1.1    Ca    8.7      31 May 2019 16:12  Mg     2.2         TPro  6.1  /  Alb  4.0  /  TBili  0.4  /  DBili  x   /  AST  15  /  ALT  19  /  AlkPhos  77    PT/INR - ( 31 May 2019 16:12 )   PT: 11.30 sec;   INR: 0.98 ratio         PTT - ( 31 May 2019 16:12 )  PTT:31.9 sec  Magnesium, Serum: 2.2 mg/dL (19 @ 16:12)  Ammonia, Serum: 25 umol/L (19 @ 16:12)  Amylase, Serum Total: 29 U/L (19 @ 16:12)  Hemoglobin A1C, Whole Blood: 5.4 % (19 @ 16:12)  Hepatitis B Surface Antigen: Nonreact (19 @ 16:12)  Hepatitis C Virus S/CO Ratio: 0.17 S/CO (19 @ 16:12)    Hepatitis C Virus Interpretation: Nonreact (19 @ 16:12)      Urinalysis Basic - ( 31 May 2019 13:40 )    Color: Yellow / Appearance: Clear / S.025 / pH: x  Gluc: x / Ketone: Negative  / Bili: Negative / Urobili: 0.2 mg/dL   Blood: x / Protein: Trace mg/dL / Nitrite: Negative   Leuk Esterase: Negative / RBC: Negative / WBC Occasional /HPF   Sq Epi: x / Non Sq Epi: Occasional /HPF / Bacteria: x    Drug Screen 1, Urine Result: Done (19 @ 13:40)        Impression and Plan:    Primary Diagnosis:  Benzo/ETOH Dependency                                Medication: Pheno Protocol    Secondary Diagnosis:  Depression                                                   Medication: on meds    Tertiary Diagnosis:                                                                                     Medication:      Continue Detox Protocols. Use of PRNS as needed for withdrawal and comfort.    Adjustments to protocols:    Labs/ Tests reviewed.    Tests ordered:     Likely Disposition: ___Home       ___Rehab       ___Outpatient Program    ___Self Help     _____Other    Estimated Length of stay:____

## 2019-06-03 PROCEDURE — 99231 SBSQ HOSP IP/OBS SF/LOW 25: CPT

## 2019-06-03 RX ORDER — PHENOBARBITAL 60 MG
32.4 TABLET ORAL ONCE
Refills: 0 | Status: DISCONTINUED | OUTPATIENT
Start: 2019-06-03 | End: 2019-06-03

## 2019-06-03 RX ORDER — GABAPENTIN 400 MG/1
600 CAPSULE ORAL THREE TIMES A DAY
Refills: 0 | Status: DISCONTINUED | OUTPATIENT
Start: 2019-06-03 | End: 2019-06-05

## 2019-06-03 RX ORDER — MIRTAZAPINE 45 MG/1
30 TABLET, ORALLY DISINTEGRATING ORAL AT BEDTIME
Refills: 0 | Status: DISCONTINUED | OUTPATIENT
Start: 2019-06-03 | End: 2019-06-05

## 2019-06-03 RX ADMIN — METHOCARBAMOL 500 MILLIGRAM(S): 500 TABLET, FILM COATED ORAL at 06:16

## 2019-06-03 RX ADMIN — GABAPENTIN 600 MILLIGRAM(S): 400 CAPSULE ORAL at 12:01

## 2019-06-03 RX ADMIN — ESCITALOPRAM OXALATE 20 MILLIGRAM(S): 10 TABLET, FILM COATED ORAL at 08:33

## 2019-06-03 RX ADMIN — METHOCARBAMOL 500 MILLIGRAM(S): 500 TABLET, FILM COATED ORAL at 00:05

## 2019-06-03 RX ADMIN — Medication 32.4 MILLIGRAM(S): at 20:29

## 2019-06-03 RX ADMIN — Medication 32.4 MILLIGRAM(S): at 17:09

## 2019-06-03 RX ADMIN — METHOCARBAMOL 500 MILLIGRAM(S): 500 TABLET, FILM COATED ORAL at 17:09

## 2019-06-03 RX ADMIN — Medication 32.4 MILLIGRAM(S): at 11:54

## 2019-06-03 RX ADMIN — Medication 32.4 MILLIGRAM(S): at 00:05

## 2019-06-03 RX ADMIN — Medication 32.4 MILLIGRAM(S): at 08:32

## 2019-06-03 RX ADMIN — Medication 32.4 MILLIGRAM(S): at 05:07

## 2019-06-03 RX ADMIN — GABAPENTIN 600 MILLIGRAM(S): 400 CAPSULE ORAL at 08:33

## 2019-06-03 RX ADMIN — MIRTAZAPINE 30 MILLIGRAM(S): 45 TABLET, ORALLY DISINTEGRATING ORAL at 21:11

## 2019-06-03 RX ADMIN — GABAPENTIN 600 MILLIGRAM(S): 400 CAPSULE ORAL at 20:28

## 2019-06-03 NOTE — CHART NOTE - NSCHARTNOTEFT_GEN_A_CORE
Subsequent Inpatient Encounter                                       Detox Unit    ANN-MARIE SUAREZ   48y   Male      Chief Complaint:    Follow up for Benzodiazipine  Dependency    HPI:     I reviewed previous notes.No Change, except if noted below.             Detail:_    ROS:   I reviewed with patient.  No changes from previous notes except if noted below.             Detail: _    PFSH I reviewed with patient. No changes from previous notes except if noted below.             Detail_    Medication reconciliation performed.    MEDICATIONS  (STANDING):  escitalopram 20 milliGRAM(s) Oral daily  gabapentin 300 milliGRAM(s) Oral three times a day  multivitamin/minerals 1 Tablet(s) Oral daily  PHENobarbital   Oral   PHENobarbital 32.4 milliGRAM(s) Oral every 6 hours      MEDICATIONS  (PRN):  acetaminophen   Tablet .. 650 milliGRAM(s) Oral every 4 hours PRN Temp greater or equal to 38C (100.4F), Moderate Pain (4 - 6)  aluminum hydroxide/magnesium hydroxide/simethicone Suspension 30 milliLiter(s) Oral every 6 hours PRN Heartburn  bismuth subsalicylate Liquid 30 milliLiter(s) Oral every 6 hours PRN Diarrhea  cloNIDine 0.1 milliGRAM(s) Oral every 8 hours PRN Blood Pressure GREATER THAN 140/90 mmHG  guaiFENesin/dextromethorphan  Syrup 5 milliLiter(s) Oral every 4 hours PRN Cough  hydrOXYzine hydrochloride 50 milliGRAM(s) Oral every 6 hours PRN Anxiety  ibuprofen  Tablet. 400 milliGRAM(s) Oral every 6 hours PRN Mild Pain (1 - 3)  magnesium hydroxide Suspension 30 milliLiter(s) Oral once PRN Constipation  methocarbamol 500 milliGRAM(s) Oral every 6 hours PRN muscle pain  PHENobarbital 32.4 milliGRAM(s) Oral every 4 hours PRN Withdrawal  pseudoephedrine 60 milliGRAM(s) Oral every 6 hours PRN Rhinitis  traZODone 100 milliGRAM(s) Oral at bedtime PRN insomnia  trimethobenzamide 300 milliGRAM(s) Oral every 6 hours PRN Nausea and/or Vomiting  trimethobenzamide Injectable 200 milliGRAM(s) IntraMuscular every 6 hours PRN Nausea and/or Vomiting      T(C): 36.2 (06-03-19 @ 06:00), Max: 37.5 (06-03-19 @ 00:00)  HR: 84 (06-03-19 @ 06:00) (71 - 97)  BP: 135/81 (06-03-19 @ 06:00) (119/88 - 151/73)  RR: 16 (06-03-19 @ 06:00) (16 - 16)  SpO2: --    PHYSICAL EXAM:      Constitutional: NAD, A&O x3    Eyes: PERRLA, no conjuctivitis    Neck: no lymphadenopathy    Respiratory: +air entry, no rales, no rhonchi, no wheezes    Cardiovascular: +S1 and S2, regular rate and rhythm    Gastrointestinal: +BS, soft, non-tender, not distended    Extremities:  no edema, no calf tenderness    Skin: no rashes, normal turgor            Magnesium, Serum: 2.2 mg/dL (05-31-19 @ 16:12)  Ammonia, Serum: 25 umol/L (05-31-19 @ 16:12)  Amylase, Serum Total: 29 U/L (05-31-19 @ 16:12)  Hemoglobin A1C, Whole Blood: 5.4 % (05-31-19 @ 16:12)  Hepatitis B Surface Antigen: Nonreact (05-31-19 @ 16:12)  Hepatitis C Virus S/CO Ratio: 0.17 S/CO (05-31-19 @ 16:12)    Hepatitis C Virus Interpretation: Nonreact (05-31-19 @ 16:12)      Drug Screen 1, Urine Result: Done (05-31-19 @ 13:40)        Impression and Plan:    Primary Diagnosis:  Benzodiazipine Dependency                                Medication: Phenobarbitol Protocol    Secondary Diagnosis:    Anxiety                                                                            Medication: on meds    Tertiary Diagnosis:                                                                                     Medication:      Continue Detox Protocols. Use of PRNS as needed for withdrawal and comfort.    Adjustments to protocols:    Labs/ Tests reviewed.    Tests ordered:     Likely Disposition: __X_Home       ___Rehab       ___Outpatient Program    ___Self Help     _____Other    Estimated Length of stay:__5__

## 2019-06-04 LAB
GAMMA INTERFERON BACKGROUND BLD IA-ACNC: 0.02 IU/ML — SIGNIFICANT CHANGE UP
M TB IFN-G BLD-IMP: NEGATIVE — SIGNIFICANT CHANGE UP
M TB IFN-G CD4+ BCKGRND COR BLD-ACNC: -0.01 IU/ML — SIGNIFICANT CHANGE UP
M TB IFN-G CD4+CD8+ BCKGRND COR BLD-ACNC: -0.01 IU/ML — SIGNIFICANT CHANGE UP
QUANT TB PLUS MITOGEN MINUS NIL: 8.14 IU/ML — SIGNIFICANT CHANGE UP

## 2019-06-04 PROCEDURE — 99231 SBSQ HOSP IP/OBS SF/LOW 25: CPT

## 2019-06-04 RX ORDER — PHENOBARBITAL 60 MG
32.4 TABLET ORAL
Refills: 0 | Status: DISCONTINUED | OUTPATIENT
Start: 2019-06-04 | End: 2019-06-05

## 2019-06-04 RX ORDER — PHENOBARBITAL 60 MG
32.4 TABLET ORAL ONCE
Refills: 0 | Status: DISCONTINUED | OUTPATIENT
Start: 2019-06-04 | End: 2019-06-04

## 2019-06-04 RX ADMIN — ESCITALOPRAM OXALATE 20 MILLIGRAM(S): 10 TABLET, FILM COATED ORAL at 09:39

## 2019-06-04 RX ADMIN — GABAPENTIN 600 MILLIGRAM(S): 400 CAPSULE ORAL at 12:17

## 2019-06-04 RX ADMIN — GABAPENTIN 600 MILLIGRAM(S): 400 CAPSULE ORAL at 21:13

## 2019-06-04 RX ADMIN — Medication 32.4 MILLIGRAM(S): at 17:39

## 2019-06-04 RX ADMIN — GABAPENTIN 600 MILLIGRAM(S): 400 CAPSULE ORAL at 09:39

## 2019-06-04 RX ADMIN — MIRTAZAPINE 30 MILLIGRAM(S): 45 TABLET, ORALLY DISINTEGRATING ORAL at 21:13

## 2019-06-04 RX ADMIN — Medication 32.4 MILLIGRAM(S): at 12:17

## 2019-06-04 RX ADMIN — Medication 32.4 MILLIGRAM(S): at 05:45

## 2019-06-04 RX ADMIN — Medication 1 TABLET(S): at 09:39

## 2019-06-04 NOTE — BEHAVIORAL HEALTH ASSESSMENT NOTE - SUMMARY
49 yo SWM w sedhyp use disorder w exacerbated use  in the setting of stressors. Pt reports dx of bpad but does not meet criteria using DSM 5 as he reports mood lability as only positive sumeet screening sx. Pt today endorsing some depressive sx and anxiety. Agrees to optimize SSRI which he  has been taking consistently and tolerating well.

## 2019-06-04 NOTE — BEHAVIORAL HEALTH ASSESSMENT NOTE - HPI (INCLUDE ILLNESS QUALITY, SEVERITY, DURATION, TIMING, CONTEXT, MODIFYING FACTORS, ASSOCIATED SIGNS AND SYMPTOMS)
47 yo SWM w sedative-hypnotic use disorder, anxiety, domiciled. Pt reports starting to drink alcohol age 16, pt reports from [age] "I experimented, you know. I pretty much tried everything." Pt reports that approx 10 years ago "I started getting high anxiety. A lot of issues with my son and my ex." Pt began taking benzos and reports "I took them responsibly for years" but admits that in recent years he has been taking more than prescribed but never made illicit purchases. Would periodically go through withdrawals at the end of the month when he ran out. Pt states "This time I detoxed too quick and I couldn't do it at home". Pt had a sz 5 days ago and presented to [facility] after event. Pt states he is motivated for recovery because- "I just don't wanna go through a fog all day. I need it to wake up, I need it to go to sleep".   Pt reports that he was diagnosed with "depression, bpad, anxiety" starting approx 10 years ago. Pt reports this is when benzo use started. Re the bpad pt reports mood lability and desire to isolate are his only symptoms. Pt does endorse getting "really depressed" at times. Pt reports working with psychiatrist in  who was prescribing Xanax and Lexapro, mirtazipine for sleep and seroquel. Pt states he uses the seroquel and mirtazapine at times for sleep but does not use it regularly. Pt reports recent stressors of mother being ill, son alienating him and attributes his drug use to this. Pt has been tolerating the Lexapro well and agrees to optimizing dose.

## 2019-06-04 NOTE — BEHAVIORAL HEALTH ASSESSMENT NOTE - NSBHSOCIALHXDETAILSFT_PSY_A_CORE
lives with parents in 2 family home  15 yo son lives w mother  retired from [employer] x 8 years  SSD for anxiety

## 2019-06-05 VITALS
RESPIRATION RATE: 14 BRPM | HEART RATE: 67 BPM | TEMPERATURE: 98 F | DIASTOLIC BLOOD PRESSURE: 58 MMHG | SYSTOLIC BLOOD PRESSURE: 100 MMHG

## 2019-06-05 LAB — T PALLIDUM AB TITR SER: NEGATIVE — SIGNIFICANT CHANGE UP

## 2019-06-05 PROCEDURE — 99238 HOSP IP/OBS DSCHRG MGMT 30/<: CPT

## 2019-06-05 NOTE — CHART NOTE - NSCHARTNOTEFT_GEN_A_CORE
Vital Signs Last 24 Hrs  T(C): 36.4 (05 Jun 2019 00:00), Max: 36.8 (04 Jun 2019 12:00)  T(F): 97.5 (05 Jun 2019 00:00), Max: 98.3 (04 Jun 2019 12:00)  HR: 67 (05 Jun 2019 00:00) (67 - 89)  BP: 100/58 (05 Jun 2019 00:00) (100/58 - 145/92)  BP(mean): --  RR: 14 (05 Jun 2019 00:00) (14 - 18)  SpO2: --  Pt wants to leave [facility].  Pt denies any SI or HI .. despite speaking to staff, pt still wants to leave [facility].

## 2019-06-09 DIAGNOSIS — F31.9 BIPOLAR DISORDER, UNSPECIFIED: ICD-10-CM

## 2019-06-09 DIAGNOSIS — F10.10 ALCOHOL ABUSE, UNCOMPLICATED: ICD-10-CM

## 2019-06-09 DIAGNOSIS — F41.1 GENERALIZED ANXIETY DISORDER: ICD-10-CM

## 2019-06-09 DIAGNOSIS — F17.210 NICOTINE DEPENDENCE, CIGARETTES, UNCOMPLICATED: ICD-10-CM

## 2019-06-09 DIAGNOSIS — F13.20 SEDATIVE, HYPNOTIC OR ANXIOLYTIC DEPENDENCE, UNCOMPLICATED: ICD-10-CM

## 2019-06-09 DIAGNOSIS — Z91.5 PERSONAL HISTORY OF SELF-HARM: ICD-10-CM

## 2019-06-18 NOTE — CHART NOTE - NSCHARTNOTEFT_GEN_A_CORE
The patient was admitted to the inpt detox unit CDU, for   ETOH____ Opioid___  Benzo__X__Polysubstance _____ Dependency.    Pt was admitted from ED__X__, Intake____, Med/surg Floor_______.    Details are present in the preceding History & Physical section and follow up chart notes.  patient was evaluated on daily detox team  rounds.  Withdrawal symptoms and signs were reviewed on a daily basis, and the protocols were adjusted accordingly.    Labs and imaging results were reviewed and discussed with the patient.    All questions from the patient were addressed.  The patient was seen by the Chemical dependency counselors, and different options for after care were discussed.  The patient attended groups, meetings and 1:1 sessions with the counselors.  Narcane Kit was offered and instructions given prior to discharge.    Psychiatry consultation reviewed____X__, N/A______    Physical therapy evaluation reviewed_____, N/A___X_    Pt was given copies of labs and imaging reports, if applicable.    Prescriptions if needed, were sent through [facility] system to the pharmacy and are noted in the discharge instruction sheet.    After care was arranged by counselors and pt was discharged to:    Home___, Outpt. Program___, Rehab ___, Long term____ [facility] ____ [facility]____ SNF____, AMA__X_, Admin Discharge____    Principal Diagnosis: Alcohol Dependency____ Opioid Dependency___ Benzo Dependency___X_ Polysubstance Dependency____

## 2021-04-08 NOTE — ED BEHAVIORAL HEALTH ASSESSMENT NOTE - DESCRIPTION
78 year old male with PMH HTN, HLD presenting with CP x4 days. CP began while he was walking. Is a substernal pressure, non radiating, non pleuritic, no associated nausea, diaphoresis or weakness. Denies LE edema, history of DVT or PE, SOB. Was evaluated at cardiologist's office today and was referred to ER for high blood pressure. Had a normal stress test 2 months ago. No history of MI. Takes daily aspirin. Denies N/V/D, fevers, abdominal pain, cough, syncope. patient denies 48 y o [place] male, spearated from wife 1 month ago, domiciled alone and estranged from children for same duration, retired, formerly employed as  at [employer], subsisting on pension and SSD (2/2 work related injury) Patient was calm and cooperative on interview, received 2mg PO Xanax at 7:55 AM and was not exhibiting any withdrawal symptoms. 78 year old male with PMH HTN, HLD, COPD presenting with CP x4 days. CP began while he was walking. Is a substernal pressure, non radiating, non pleuritic, no associated nausea, diaphoresis or weakness. Denies LE edema, history of DVT or PE, SOB. Was evaluated at cardiologist's office today and was referred to ER for high blood pressure. Had a normal stress test 2 months ago. No history of MI. Takes daily aspirin. Denies N/V/D, fevers, abdominal pain, cough, syncope. 78 year old male with PMH HTN, HLD, COPD, BPH, hypothyroidism presenting with CP x4 days. CP began while he was walking. Is a substernal pressure, non radiating, non pleuritic, no associated nausea, diaphoresis or weakness. Denies LE edema, history of DVT or PE, SOB. Was evaluated at cardiologist's office today and was referred to ER for high blood pressure. Had a normal stress test 2 months ago. No history of MI. Takes daily aspirin. Denies N/V/D, fevers, abdominal pain, cough, syncope.

## 2021-12-08 NOTE — H&P ADULT - PROBLEM SELECTOR PLAN 2
Check Urine Toxicology  phenobarbital Protocol  MVI 1 tablet PO daily  Monitor VS and withdrawal symptoms  PRN Medications
numerical 0-10

## 2022-10-18 NOTE — BEHAVIORAL HEALTH ASSESSMENT NOTE - NSBHCONSULTFOLLOWAFTERCARE_PSY_A_CORE FT
ANTICOAGULATION MANAGEMENT     Michael Martinez 86 year old male is on warfarin with supratherapeutic INR result. (Goal INR 2.0-3.0)    Recent labs: (last 7 days)     10/18/22  0814   INR 3.1*       ASSESSMENT     Source(s): Chart Review and In person     Warfarin doses taken: Warfarin taken as instructed  Diet: No new diet changes identified  New illness, injury, or hospitalization: No  Medication/supplement changes: None noted  Signs or symptoms of bleeding or clotting: No  Previous INR: Therapeutic last visit; previously outside of goal range  Additional findings: Patient likes to be on the higher end of his range due to having less pain in his leg when it is higher       PLAN     Recommended plan for no diet, medication or health factor changes affecting INR     Dosing Instructions: Continue your current warfarin dose with next INR in 3 weeks       Summary  As of 10/18/2022    Full warfarin instructions:  5 mg every Mon, Wed, Fri; 7.5 mg all other days   Next INR check:  11/8/2022             In person    Lab visit scheduled    Education provided: Please call back if any changes to your diet, medications or how you've been taking warfarin and Monitoring for bleeding signs and symptoms    Plan made per [facility] anticoagulation protocol    Radha Seo RN  Anticoagulation Clinic  10/18/2022    _______________________________________________________________________     Anticoagulation Episode Summary     Current INR goal:  2.0-3.0   TTR:  43.4 % (1 y)   Target end date:  Indefinite   Send INR reminders to:  [facility]    Indications    Long-term (current) use of anticoagulants [Z79.01] [Z79.01]  Anticoagulation monitoring  INR range 2-3 [Z79.01]  History of pulmonary embolism [Z86.711]  Personal history of DVT (deep vein thrombosis) [Z86.718]           Comments:           Anticoagulation Care Providers     Provider Role Specialty Phone number    Stephane Freedman MD Referring Surgery  [phone]    Isma Martinez MD [facility] Internal Medicine [phone]             tbd with assigned counselor

## 2024-08-10 NOTE — ED BEHAVIORAL HEALTH ASSESSMENT NOTE - DOMICILED WITH
Stable  no SOB, palpitations, dizziness   rate controlled  Xarelto   bleeding precaution  monitor   Other